# Patient Record
Sex: FEMALE | Race: WHITE | NOT HISPANIC OR LATINO
[De-identification: names, ages, dates, MRNs, and addresses within clinical notes are randomized per-mention and may not be internally consistent; named-entity substitution may affect disease eponyms.]

---

## 2021-04-28 ENCOUNTER — COMPREHENSIVE EXAM (OUTPATIENT)
Dept: URBAN - METROPOLITAN AREA CLINIC 48 | Facility: CLINIC | Age: 66
End: 2021-04-28

## 2021-04-28 DIAGNOSIS — H35.363: ICD-10-CM

## 2021-04-28 DIAGNOSIS — H04.123: ICD-10-CM

## 2021-04-28 DIAGNOSIS — H40.013: ICD-10-CM

## 2021-04-28 DIAGNOSIS — H25.813: ICD-10-CM

## 2021-04-28 DIAGNOSIS — H52.4: ICD-10-CM

## 2021-04-28 PROCEDURE — 92133 CPTRZD OPH DX IMG PST SGM ON: CPT

## 2021-04-28 PROCEDURE — 92015 DETERMINE REFRACTIVE STATE: CPT

## 2021-04-28 PROCEDURE — 92004 COMPRE OPH EXAM NEW PT 1/>: CPT

## 2021-04-28 ASSESSMENT — KERATOMETRY
OD_K2POWER_DIOPTERS: 45.75
OS_AXISANGLE_DEGREES: 055
OD_AXISANGLE2_DEGREES: 70
OD_AXISANGLE_DEGREES: 160
OS_AXISANGLE2_DEGREES: 145
OS_K1POWER_DIOPTERS: 45.75
OD_K1POWER_DIOPTERS: 46.25
OS_K2POWER_DIOPTERS: 45.00

## 2021-04-28 ASSESSMENT — VISUAL ACUITY
OS_GLARE: 20/50
OS_GLARE: 20/60
OU_CC: 20/30
OS_CC: 20/30
OD_CC: 20/30-1
OD_GLARE: 20/50
OS_CC: J1
OD_GLARE: 20/60
OD_CC: J1
OU_CC: J1

## 2021-04-28 ASSESSMENT — TONOMETRY
OD_IOP_MMHG: 14
OS_IOP_MMHG: 15

## 2021-04-28 NOTE — PATIENT DISCUSSION
Based on c/d asymmetry OD>OS. IOP 14/15. No fam hx noted. 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 Will monitor.